# Patient Record
Sex: MALE | Race: WHITE | URBAN - METROPOLITAN AREA
[De-identification: names, ages, dates, MRNs, and addresses within clinical notes are randomized per-mention and may not be internally consistent; named-entity substitution may affect disease eponyms.]

---

## 2024-03-13 ENCOUNTER — OFFICE VISIT (OUTPATIENT)
Age: 29
End: 2024-03-13

## 2024-03-13 VITALS
HEART RATE: 72 BPM | RESPIRATION RATE: 17 BRPM | HEIGHT: 72 IN | OXYGEN SATURATION: 96 % | BODY MASS INDEX: 39.93 KG/M2 | WEIGHT: 294.8 LBS | SYSTOLIC BLOOD PRESSURE: 136 MMHG | TEMPERATURE: 97.9 F | DIASTOLIC BLOOD PRESSURE: 85 MMHG

## 2024-03-13 DIAGNOSIS — J10.1 INFLUENZA B: Primary | ICD-10-CM

## 2024-03-13 DIAGNOSIS — J40 BRONCHITIS: ICD-10-CM

## 2024-03-13 DIAGNOSIS — R05.1 ACUTE COUGH: ICD-10-CM

## 2024-03-13 LAB
INFLUENZA A ANTIGEN, POC: NEGATIVE
INFLUENZA B ANTIGEN, POC: POSITIVE

## 2024-03-13 RX ORDER — BENZONATATE 200 MG/1
200 CAPSULE ORAL 3 TIMES DAILY PRN
Qty: 30 CAPSULE | Refills: 0 | Status: SHIPPED | OUTPATIENT
Start: 2024-03-13

## 2024-03-13 RX ORDER — AZITHROMYCIN 250 MG/1
TABLET, FILM COATED ORAL
Qty: 6 TABLET | Refills: 0 | Status: SHIPPED | OUTPATIENT
Start: 2024-03-13 | End: 2024-03-23

## 2024-03-13 NOTE — PROGRESS NOTES
Eloise Kern (:  1995) is a 28 y.o. male,New patient, here for evaluation of the following chief complaint(s):  URI ( cough, congestion, nasal drainage, headache, chills, lungs hurt, nausea, diarrhea, fatigue and ran down. )      ASSESSMENT/PLAN:    ICD-10-CM    1. Influenza B  J10.1       2. Acute cough  R05.1 POCT Influenza A/B Antigen (BD Veritor)      3. Bronchitis  J40 azithromycin (ZITHROMAX) 250 MG tablet     benzonatate (TESSALON) 200 MG capsule        Results for POC orders placed in visit on 24   POCT Influenza A/B Antigen (BD Veritor)   Result Value Ref Range    Inflenza A Ag negative     Influenza B Ag positive       Day  #3-4.with symptoms  , no benefit to treat with tamiflu at this time  No test for covid / would not treat due to duration of symptoms  Will start antibiotic dur to worsening symptoms, not improving   Patient ok with plan    Keep hydrated, tylenol or ibuprofen (if no contraindications) as needed if pain or fever.. Take medications as prescribed.. follow up in 7- days if not better  Return sooner or go see PCP if symptoms worse/feeling worse or has new symptoms or concerns  Go to ER  if fever/chills, increase shortness of breath, increase congestion or wheezing despite medications, if associated with chest pain, nausea/vomiting, dizzy/ light-headed sensation.    SUBJECTIVE/OBJECTIVE:  Patient presents with:  URI:  cough, congestion, nasal drainage, headache, chills, lungs hurt, nausea, diarrhea, fatigue and run down.          History provided by:  Patient      Vitals:    24 1829   BP: 136/85   Site: Left Upper Arm   Position: Sitting   Cuff Size: Large Adult   Pulse: 72   Resp: 17   Temp: 97.9 °F (36.6 °C)   TempSrc: Oral   SpO2: 96%   Weight: 133.7 kg (294 lb 12.8 oz)   Height: 1.829 m (6')       Review of Systems   Constitutional:  Positive for chills.   HENT:  Positive for congestion and sinus pressure.    Respiratory:  Positive for cough and chest

## 2024-03-14 ASSESSMENT — ENCOUNTER SYMPTOMS
SINUS PRESSURE: 1
SHORTNESS OF BREATH: 0
COUGH: 1
NAUSEA: 1
ABDOMINAL PAIN: 0
DIARRHEA: 1
VOMITING: 0
CHEST TIGHTNESS: 1

## 2025-02-03 ENCOUNTER — OFFICE VISIT (OUTPATIENT)
Age: 30
End: 2025-02-03

## 2025-02-03 VITALS
BODY MASS INDEX: 42.12 KG/M2 | RESPIRATION RATE: 18 BRPM | HEART RATE: 84 BPM | WEIGHT: 311 LBS | OXYGEN SATURATION: 96 % | SYSTOLIC BLOOD PRESSURE: 130 MMHG | DIASTOLIC BLOOD PRESSURE: 87 MMHG | HEIGHT: 72 IN | TEMPERATURE: 98.8 F

## 2025-02-03 DIAGNOSIS — R68.89 FLU-LIKE SYMPTOMS: ICD-10-CM

## 2025-02-03 DIAGNOSIS — J10.1 INFLUENZA A: Primary | ICD-10-CM

## 2025-02-03 LAB
INFLUENZA A ANTIGEN, POC: ABNORMAL
INFLUENZA B ANTIGEN, POC: ABNORMAL

## 2025-02-03 ASSESSMENT — ENCOUNTER SYMPTOMS
COUGH: 1
DIARRHEA: 1
NAUSEA: 1

## 2025-02-03 NOTE — PROGRESS NOTES
Eloise Kern (: 1995) is a 29 y.o. male, New patient, here for evaluation of the following chief complaint(s):  Cough (Cough, headaches, body aches, chills and heat flashes, started Saturday)      Visit date not found    ASSESSMENT/PLAN:    ICD-10-CM    1. Influenza A  J10.1       2. Flu-like symptoms  R68.89 POCT Influenza A/B Antigen        Influenza A    Flu positive; Expect 5-7 days of symptoms. You may have a few days of feeling worse before you feel better. Wear a mask in public. Avoid contact with people until fever free for 24 hours. Maintain basic infection control behaviors like washing hands, covering mouth. Follow CDC guidelines regarding isolation.   Symptoms include cough, nausea/vomiting, diarrhea, with exam findings of congestion, rhinorrhea, pharyngeal erythema, post nasal drip, there is concern Influenza  Low concern for bacterial etiology of symptoms given lack of purulent findings and current course of illness less than 10 days, otitis media, strep pharyngitis, mastoiditis, cellulitis, abscess, facial cellulitis, retained foreign bodies within the ear canal, otic eczema, and ear trauma, Bryn's angina, uvulitis, peritonsillar abscess, mononucleosis, scarlet fever, and strep rash,  Recommended:  OTC Pseudoephedrine, Flonase, Zyrtec, and Saline nasal spray for congestion relief, advised to avoid with hx HTN or cardiac issues  Acetaminophen (Tylenol) and/or ibuprofen (Motrin, Advil) for aches/pains as needed, provided there are no contraindications  [place] diet, increased fluids, ginger candies for nausea/vomiting relief  Sipping on warm beverages (tea with honey), ice cream, popsicles, sore throat lozenges, Chloraseptic spray, warm salt water gargles for sore throat relief  Prescribed:   Discussed Tamiflu, Declined  Recommended several other OTC and home remedy treatments for symptomatic relief as well  Strict ED follow up instructions provided    Discussed PCP follow up for

## 2025-02-03 NOTE — PATIENT INSTRUCTIONS
Flu positive; Expect 5-7 days of symptoms. You may have a few days of feeling worse before you feel better. Wear a mask in public. Avoid contact with people until fever free for 24 hours. Maintain basic infection control behaviors like washing hands, covering mouth. Follow CDC guidelines regarding isolation.   Take medications as directed.  For congestion and runny nose, I recommend Pseudoephedrine, Flonase (or other steroid nasal sprays), Zyrtec (or other antihistamines), Vicks vapor rub, and saline nasal spray. Get the pseudoephedrine from BEHIND the pharmacy counter. It does not need a prescription. Avoid this if you have a history of high blood pressure or heart conditions. Do not take other decongestants while on this medication.  For cough, I recommend Dextromethorphan (Robitussin, Delsym) and Rd's Vapor Rub. Do not take other cough medications containing Dextromethorphan (DM) while on this medication. You can also use cough drops, honey, and throat lozenges. I recommend 1-2 teaspoons of honey every hour for relief of throat irritation and coughing fits.  For sore throat, you can use throat sprays (Chloraseptic, Cepacol), sipping on warm beverages (tea with honey), ice cream, popsicles, sore throat lozenges, and warm salt water gargles.  For fever, aches/pains, you can take ibuprofen (Advil, Motrin) and acetaminophen (Tylenol) for fevers, aches, and pains, if needed. Do not take this if you have been told to avoid these medications.  For ear pain, I recommend warm compresses over the symptomatic ear(s) for 10-15 minutes, or a hot shower, followed by 1-2 minutes of massaging the area behind your ears and down the jaw-line to help with the ear congestion/ear pressure  Increase your fluid intake and get lots of rest.  Warm teas, humidifiers, nasal lavages, and sleeping in an inclined position are also helpful options that can lessen symptoms.  [place]ryan diet (bananas, rice, applesauce and toast). Avoid fatty foods,